# Patient Record
Sex: FEMALE | Race: WHITE
[De-identification: names, ages, dates, MRNs, and addresses within clinical notes are randomized per-mention and may not be internally consistent; named-entity substitution may affect disease eponyms.]

---

## 2019-09-23 ENCOUNTER — HOSPITAL ENCOUNTER (EMERGENCY)
Dept: HOSPITAL 95 - ER | Age: 39
Discharge: LEFT BEFORE DISCHARGE | End: 2019-09-23
Payer: COMMERCIAL

## 2019-09-23 VITALS — HEIGHT: 63 IN | WEIGHT: 155.01 LBS | BODY MASS INDEX: 27.46 KG/M2

## 2019-09-23 DIAGNOSIS — Z53.21: Primary | ICD-10-CM

## 2020-07-10 ENCOUNTER — HOSPITAL ENCOUNTER (EMERGENCY)
Dept: HOSPITAL 95 - ER | Age: 40
Discharge: HOME | End: 2020-07-10
Payer: COMMERCIAL

## 2020-07-10 VITALS — BODY MASS INDEX: 28.35 KG/M2 | WEIGHT: 159.99 LBS | HEIGHT: 63 IN

## 2020-07-10 DIAGNOSIS — F17.210: ICD-10-CM

## 2020-07-10 DIAGNOSIS — R07.9: Primary | ICD-10-CM

## 2020-07-10 DIAGNOSIS — Z88.5: ICD-10-CM

## 2020-07-10 LAB
ALBUMIN SERPL BCP-MCNC: 3.9 G/DL (ref 3.4–5)
ALBUMIN/GLOB SERPL: 1 {RATIO} (ref 0.8–1.8)
ALT SERPL W P-5'-P-CCNC: 18 U/L (ref 12–78)
ANION GAP SERPL CALCULATED.4IONS-SCNC: 7 MMOL/L (ref 6–16)
AST SERPL W P-5'-P-CCNC: 13 U/L (ref 12–37)
BASOPHILS # BLD AUTO: 0.04 K/MM3 (ref 0–0.23)
BASOPHILS NFR BLD AUTO: 1 % (ref 0–2)
BILIRUB SERPL-MCNC: 0.4 MG/DL (ref 0.1–1)
BUN SERPL-MCNC: 15 MG/DL (ref 8–24)
CALCIUM SERPL-MCNC: 9.1 MG/DL (ref 8.5–10.1)
CHLORIDE SERPL-SCNC: 108 MMOL/L (ref 98–108)
CO2 SERPL-SCNC: 25 MMOL/L (ref 21–32)
CREAT SERPL-MCNC: 0.71 MG/DL (ref 0.4–1)
DEPRECATED RDW RBC AUTO: 41.8 FL (ref 35.1–46.3)
EOSINOPHIL # BLD AUTO: 0.14 K/MM3 (ref 0–0.68)
EOSINOPHIL NFR BLD AUTO: 2 % (ref 0–6)
ERYTHROCYTE [DISTWIDTH] IN BLOOD BY AUTOMATED COUNT: 12.7 % (ref 11.7–14.2)
GLOBULIN SER CALC-MCNC: 3.8 G/DL (ref 2.2–4)
GLUCOSE SERPL-MCNC: 76 MG/DL (ref 70–99)
HCT VFR BLD AUTO: 38.4 % (ref 33–51)
HGB BLD-MCNC: 12.9 G/DL (ref 11.5–16)
IMM GRANULOCYTES # BLD AUTO: 0.02 K/MM3 (ref 0–0.1)
IMM GRANULOCYTES NFR BLD AUTO: 0 % (ref 0–1)
LYMPHOCYTES # BLD AUTO: 1.85 K/MM3 (ref 0.84–5.2)
LYMPHOCYTES NFR BLD AUTO: 24 % (ref 21–46)
MCHC RBC AUTO-ENTMCNC: 33.6 G/DL (ref 31.5–36.5)
MCV RBC AUTO: 91 FL (ref 80–100)
MONOCYTES # BLD AUTO: 0.71 K/MM3 (ref 0.16–1.47)
MONOCYTES NFR BLD AUTO: 9 % (ref 4–13)
NEUTROPHILS # BLD AUTO: 4.93 K/MM3 (ref 1.96–9.15)
NEUTROPHILS NFR BLD AUTO: 64 % (ref 41–73)
NRBC # BLD AUTO: 0 K/MM3 (ref 0–0.02)
NRBC BLD AUTO-RTO: 0 /100 WBC (ref 0–0.2)
PLATELET # BLD AUTO: 292 K/MM3 (ref 150–400)
POTASSIUM SERPL-SCNC: 3.9 MMOL/L (ref 3.5–5.5)
PROT SERPL-MCNC: 7.7 G/DL (ref 6.4–8.2)
SODIUM SERPL-SCNC: 140 MMOL/L (ref 136–145)
TROPONIN I SERPL-MCNC: <0.015 NG/ML (ref 0–0.04)

## 2020-10-03 ENCOUNTER — HOSPITAL ENCOUNTER (EMERGENCY)
Dept: HOSPITAL 95 - ER | Age: 40
LOS: 1 days | Discharge: LEFT BEFORE DISCHARGE | End: 2020-10-04
Payer: COMMERCIAL

## 2020-10-03 VITALS — WEIGHT: 158.01 LBS | BODY MASS INDEX: 28 KG/M2 | HEIGHT: 63 IN

## 2020-10-03 DIAGNOSIS — Z53.21: Primary | ICD-10-CM

## 2021-01-17 ENCOUNTER — HOSPITAL ENCOUNTER (EMERGENCY)
Dept: HOSPITAL 95 - ER | Age: 41
Discharge: HOME | End: 2021-01-17
Payer: COMMERCIAL

## 2021-01-17 VITALS — WEIGHT: 150 LBS | HEIGHT: 63 IN | BODY MASS INDEX: 26.58 KG/M2

## 2021-01-17 DIAGNOSIS — L02.31: Primary | ICD-10-CM

## 2021-01-17 DIAGNOSIS — F17.210: ICD-10-CM

## 2021-01-17 DIAGNOSIS — Z88.5: ICD-10-CM

## 2021-06-03 ENCOUNTER — HOSPITAL ENCOUNTER (EMERGENCY)
Dept: HOSPITAL 95 - ER | Age: 41
Discharge: LEFT BEFORE DISCHARGE | End: 2021-06-03
Payer: COMMERCIAL

## 2021-06-03 VITALS — HEIGHT: 63 IN | BODY MASS INDEX: 27.46 KG/M2 | WEIGHT: 155.01 LBS

## 2021-06-03 DIAGNOSIS — Z53.21: ICD-10-CM

## 2021-06-03 DIAGNOSIS — R10.31: Primary | ICD-10-CM

## 2021-06-03 LAB
ALBUMIN SERPL BCP-MCNC: 3.8 G/DL (ref 3.4–5)
ALBUMIN/GLOB SERPL: 1 {RATIO} (ref 0.8–1.8)
ALT SERPL W P-5'-P-CCNC: 19 U/L (ref 12–78)
ANION GAP SERPL CALCULATED.4IONS-SCNC: 3 MMOL/L (ref 6–16)
AST SERPL W P-5'-P-CCNC: 12 U/L (ref 12–37)
BASOPHILS # BLD AUTO: 0.05 K/MM3 (ref 0–0.23)
BASOPHILS NFR BLD AUTO: 1 % (ref 0–2)
BILIRUB SERPL-MCNC: 0.3 MG/DL (ref 0.1–1)
BUN SERPL-MCNC: 15 MG/DL (ref 8–24)
CALCIUM SERPL-MCNC: 9.1 MG/DL (ref 8.5–10.1)
CHLORIDE SERPL-SCNC: 106 MMOL/L (ref 98–108)
CO2 SERPL-SCNC: 30 MMOL/L (ref 21–32)
CREAT SERPL-MCNC: 0.81 MG/DL (ref 0.4–1)
DEPRECATED RDW RBC AUTO: 42.6 FL (ref 35.1–46.3)
EOSINOPHIL # BLD AUTO: 0.16 K/MM3 (ref 0–0.68)
EOSINOPHIL NFR BLD AUTO: 2 % (ref 0–6)
ERYTHROCYTE [DISTWIDTH] IN BLOOD BY AUTOMATED COUNT: 12.9 % (ref 11.7–14.2)
GLOBULIN SER CALC-MCNC: 3.7 G/DL (ref 2.2–4)
GLUCOSE SERPL-MCNC: 75 MG/DL (ref 70–99)
HCT VFR BLD AUTO: 38 % (ref 33–51)
HGB BLD-MCNC: 12.5 G/DL (ref 11.5–16)
IMM GRANULOCYTES # BLD AUTO: 0.02 K/MM3 (ref 0–0.1)
IMM GRANULOCYTES NFR BLD AUTO: 0 % (ref 0–1)
LYMPHOCYTES # BLD AUTO: 2.03 K/MM3 (ref 0.84–5.2)
LYMPHOCYTES NFR BLD AUTO: 26 % (ref 21–46)
MCHC RBC AUTO-ENTMCNC: 32.9 G/DL (ref 31.5–36.5)
MCV RBC AUTO: 91 FL (ref 80–100)
MONOCYTES # BLD AUTO: 0.62 K/MM3 (ref 0.16–1.47)
MONOCYTES NFR BLD AUTO: 8 % (ref 4–13)
NEUTROPHILS # BLD AUTO: 4.92 K/MM3 (ref 1.96–9.15)
NEUTROPHILS NFR BLD AUTO: 63 % (ref 41–73)
NRBC # BLD AUTO: 0 K/MM3 (ref 0–0.02)
NRBC BLD AUTO-RTO: 0 /100 WBC (ref 0–0.2)
PLATELET # BLD AUTO: 316 K/MM3 (ref 150–400)
POTASSIUM SERPL-SCNC: 3.6 MMOL/L (ref 3.5–5.5)
PROT SERPL-MCNC: 7.5 G/DL (ref 6.4–8.2)
SODIUM SERPL-SCNC: 139 MMOL/L (ref 136–145)

## 2021-08-08 ENCOUNTER — HOSPITAL ENCOUNTER (EMERGENCY)
Dept: HOSPITAL 95 - ER | Age: 41
Discharge: LEFT BEFORE DISCHARGE | End: 2021-08-08
Payer: COMMERCIAL

## 2021-08-08 VITALS — BODY MASS INDEX: 26.58 KG/M2 | WEIGHT: 150 LBS | HEIGHT: 63 IN

## 2021-08-08 DIAGNOSIS — F15.90: ICD-10-CM

## 2021-08-08 DIAGNOSIS — F11.90: ICD-10-CM

## 2021-08-08 DIAGNOSIS — Z53.20: ICD-10-CM

## 2021-08-08 DIAGNOSIS — Z02.89: Primary | ICD-10-CM

## 2021-08-10 ENCOUNTER — HOSPITAL ENCOUNTER (OUTPATIENT)
Dept: HOSPITAL 95 - LAB | Age: 41
Discharge: HOME | End: 2021-08-10
Attending: NURSE PRACTITIONER
Payer: COMMERCIAL

## 2021-08-10 DIAGNOSIS — Z88.5: ICD-10-CM

## 2021-08-10 DIAGNOSIS — R35.0: Primary | ICD-10-CM

## 2022-05-03 ENCOUNTER — HOSPITAL ENCOUNTER (OUTPATIENT)
Dept: HOSPITAL 95 - ORSCSDS | Age: 42
Discharge: HOME | End: 2022-05-03
Attending: ORTHOPAEDIC SURGERY
Payer: COMMERCIAL

## 2022-05-03 VITALS — HEIGHT: 63 IN | BODY MASS INDEX: 38.12 KG/M2 | WEIGHT: 215.17 LBS

## 2022-05-03 DIAGNOSIS — Z87.891: ICD-10-CM

## 2022-05-03 DIAGNOSIS — S62.615A: Primary | ICD-10-CM

## 2022-05-03 DIAGNOSIS — F41.9: ICD-10-CM
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
## 2022-05-03 PROCEDURE — C1713 ANCHOR/SCREW BN/BN,TIS/BN: HCPCS

## 2022-05-03 PROCEDURE — 0PSV04Z REPOSITION LEFT FINGER PHALANX WITH INTERNAL FIXATION DEVICE, OPEN APPROACH: ICD-10-PCS | Performed by: ORTHOPAEDIC SURGERY

## 2022-05-03 NOTE — NUR
05/03/22 1700 Olivia Adair
PT WOKE FROM [place] WR[place]ING AND SCREAMING IN PAIN. ENCOURAGED PT
TO TAKE SLOW DEEP BREATHS. PT CONTINUED BREATHING HEAVING, STATING SHE
CANNOT BREATH, OXYGEN SATURATION % ROOM AIR. PT STATED SHE
NEEDED HER TEETH BACK IN, IT WAS MORE IMPORTANT THAN PAIN MEDICATION.
LOCATED THE TEETH, PT CONTINUES TO YELL IN PAIN, WRITHING AROUND THE
BED, STATOING SHE CAN'T BREATH, SHE NEEDS SOME WATER AND SHE IS IN
PAIN. PT GIVEN 25MCGS OF FENTANYL, NO CHANGES. PT STARTED SCREAMING
THAT WE NEEDED TO DO SOMTHING FOR HER AND SHE NEED WATER. I EXPLAINED
TO HER I CAN NOT GIVE HER WATER IF SHE CAN NOT BREATH DUE TO
ASPIRATION RISK. PT CONTINUES TO YELL, DR. DRIVER NOTIFIED. NIKA CALDERON GIVEN
REPORT.

## 2022-06-07 ENCOUNTER — HOSPITAL ENCOUNTER (OUTPATIENT)
Dept: HOSPITAL 95 - ORSCSDS | Age: 42
Discharge: HOME | End: 2022-06-07
Attending: ORTHOPAEDIC SURGERY
Payer: COMMERCIAL

## 2022-06-07 VITALS — WEIGHT: 145.51 LBS | BODY MASS INDEX: 25.78 KG/M2 | HEIGHT: 63 IN

## 2022-06-07 DIAGNOSIS — Z87.891: ICD-10-CM

## 2022-06-07 DIAGNOSIS — S62.615A: Primary | ICD-10-CM

## 2022-06-07 DIAGNOSIS — B19.20: ICD-10-CM

## 2022-06-07 PROCEDURE — 0PSV04Z REPOSITION LEFT FINGER PHALANX WITH INTERNAL FIXATION DEVICE, OPEN APPROACH: ICD-10-PCS | Performed by: ORTHOPAEDIC SURGERY

## 2022-06-07 PROCEDURE — C1713 ANCHOR/SCREW BN/BN,TIS/BN: HCPCS

## 2022-06-07 PROCEDURE — A9270 NON-COVERED ITEM OR SERVICE: HCPCS

## 2022-06-07 NOTE — NUR
06/07/22 1730 Olivia Adair
PT GIVEN DILAUDID, NO REACTION AT THE SITE, NO REDNESS. PATIENT PAIN
CONTINUES. DR. DRIVER AT BEDSIDE, AWARE OF DILAUDID MEDICATION GIVEN

## 2022-10-16 ENCOUNTER — HOSPITAL ENCOUNTER (EMERGENCY)
Dept: HOSPITAL 95 - ER | Age: 42
Discharge: HOME | End: 2022-10-16
Payer: COMMERCIAL

## 2022-10-16 VITALS — WEIGHT: 145 LBS | HEIGHT: 63 IN | BODY MASS INDEX: 25.69 KG/M2

## 2022-10-16 DIAGNOSIS — Z88.5: ICD-10-CM

## 2022-10-16 DIAGNOSIS — F17.210: ICD-10-CM

## 2022-10-16 DIAGNOSIS — K04.7: Primary | ICD-10-CM

## 2022-10-16 DIAGNOSIS — Z79.899: ICD-10-CM
